# Patient Record
Sex: MALE | Race: WHITE
[De-identification: names, ages, dates, MRNs, and addresses within clinical notes are randomized per-mention and may not be internally consistent; named-entity substitution may affect disease eponyms.]

---

## 2022-09-29 ENCOUNTER — HOSPITAL ENCOUNTER (EMERGENCY)
Dept: HOSPITAL 26 - MED | Age: 32
Discharge: HOME | End: 2022-09-29
Payer: SELF-PAY

## 2022-09-29 VITALS — SYSTOLIC BLOOD PRESSURE: 125 MMHG | DIASTOLIC BLOOD PRESSURE: 74 MMHG

## 2022-09-29 VITALS — BODY MASS INDEX: 25.77 KG/M2 | WEIGHT: 180 LBS | HEIGHT: 70 IN

## 2022-09-29 DIAGNOSIS — F15.90: ICD-10-CM

## 2022-09-29 DIAGNOSIS — R03.0: ICD-10-CM

## 2022-09-29 DIAGNOSIS — L08.9: Primary | ICD-10-CM

## 2022-09-29 DIAGNOSIS — Z79.899: ICD-10-CM

## 2022-09-29 RX ADMIN — IBUPROFEN ONE MG: 600 TABLET ORAL at 14:51

## 2022-09-29 RX ADMIN — IBUPROFEN ONE MG: 600 TABLET ORAL at 14:12

## 2022-09-29 NOTE — NUR
BIB[place] FROM [place] C/O 10/10 R FOOT PAIN AND R BIG TOE PAIN & NAIL FELL OFF 
& +PURULENT DRAINAGE. X1 WEEK. PT REPORTS BEING RELEASED FROM [facility] X1 WEEK AGO 
AND HAS BEEN WALKING THE STREETS SINCE. PT USED [facility] THIS AM.

## 2022-09-29 NOTE — NUR
ATTEMPTED TO D/C PT. PT NOT FOUND IN LOBBY/OUTSIDE. PT LEFT WITHOUT D/C PAPERS. 
RX OF BACITRACIN AND KEFLEX SENT TO [facility]ARM[facility]. HOMLESS RESOURCE PACKET NOT 
GIVEN